# Patient Record
Sex: FEMALE
[De-identification: names, ages, dates, MRNs, and addresses within clinical notes are randomized per-mention and may not be internally consistent; named-entity substitution may affect disease eponyms.]

---

## 2020-11-17 ENCOUNTER — HOSPITAL ENCOUNTER (OUTPATIENT)
Dept: HOSPITAL 5 - SPVWC | Age: 65
Discharge: HOME | End: 2020-11-17
Attending: SURGERY
Payer: MEDICARE

## 2020-11-17 DIAGNOSIS — R92.8: Primary | ICD-10-CM

## 2020-11-17 PROCEDURE — 77066 DX MAMMO INCL CAD BI: CPT

## 2021-05-06 ENCOUNTER — HOSPITAL ENCOUNTER (OUTPATIENT)
Dept: HOSPITAL 5 - SPVWC | Age: 66
Discharge: HOME | End: 2021-05-06
Attending: SURGERY
Payer: MEDICARE

## 2021-05-06 DIAGNOSIS — N60.01: Primary | ICD-10-CM

## 2021-05-06 NOTE — ULTRASOUND REPORT
ULTRASOUND BREAST BILATERAL COMPLETE, 5/6/2021



CLINICAL INFORMATION / INDICATION: Follow-up cystic areas.



TECHNIQUE: Complete sonographic evaluation of all 4 quadrants and retroareolar region was performed. 
 



COMPARISON: Bilateral breast ultrasound 11/17/2020



FINDINGS: On the right multiple small breast cysts are again seen which all appear simple now an lorelei
gn. Largest measures 1 cm in the 11:00 position centrally. 9 appearing ductal ectasia is seen in the 
retroareolar region. No significant axillary abnormalities are seen.



On the left retroareolar area of what appear to be small benign simple cysts are seen with perhaps a 
small amount of benign-appearing ductal ectasia. In the 1:00 position a minimal simple cyst is seen. 
In the 2:00 position, 1 cm from the nipple, an ovoid wider than tall 6 mm comminuted cyst versus lorelei
gn-appearing nodule is seen. This appears likely to be the 6 mm hypoechoic area seen previously and t
nagi this appears reduced in volume though stable in greatest diameter. In the 2:00 position in the r
etroareolar region, a complicated cystic area is seen measuring just under 8 mm which measured just o
zane 8 mm previously. This at least has not increased in size and may have decreased.





IMPRESSION: 

1. On the right only benign simple cysts are seen today.

2. On the left benign-appearing abnormalities are at least stable if not slightly improved.



Follow up recommendation: Follow-up left breast ultrasound in 6 months



BI-RADS Category 3:  Probably Benign. Followup in 6 months.





-------------------------------------------------------------------------------------------

A normal or "negative" report should not preclude biopsy or follow-up of a clinically suspicious find
ing.



Signer Name: James Hernandez MD 

Signed: 5/6/2021 10:43 AM

Workstation Name: [email or web address]

## 2021-06-21 ENCOUNTER — DASHBOARD ENCOUNTERS (OUTPATIENT)
Age: 66
End: 2021-06-21

## 2021-06-21 ENCOUNTER — LAB OUTSIDE AN ENCOUNTER (OUTPATIENT)
Dept: URBAN - METROPOLITAN AREA CLINIC 70 | Facility: CLINIC | Age: 66
End: 2021-06-21

## 2021-06-21 ENCOUNTER — OFFICE VISIT (OUTPATIENT)
Dept: URBAN - METROPOLITAN AREA CLINIC 70 | Facility: CLINIC | Age: 66
End: 2021-06-21
Payer: MEDICARE

## 2021-06-21 ENCOUNTER — WEB ENCOUNTER (OUTPATIENT)
Dept: URBAN - METROPOLITAN AREA CLINIC 70 | Facility: CLINIC | Age: 66
End: 2021-06-21

## 2021-06-21 DIAGNOSIS — R19.8 ALTERNATING CONSTIPATION AND DIARRHEA: ICD-10-CM

## 2021-06-21 DIAGNOSIS — Z86.010 PERSONAL HISTORY OF COLONIC POLYPS: ICD-10-CM

## 2021-06-21 PROBLEM — 428283002 HISTORY OF POLYP OF COLON (SITUATION): Status: ACTIVE | Noted: 2021-06-21

## 2021-06-21 PROCEDURE — 99214 OFFICE O/P EST MOD 30 MIN: CPT | Performed by: INTERNAL MEDICINE

## 2021-06-21 RX ORDER — DICYCLOMINE HYDROCHLORIDE 20 MG/1
TAKE 1 TABLET (20 MG) BY ORAL ROUTE 4 TIMES PER DAY TABLET ORAL
Qty: 0 | Refills: 0 | Status: DISCONTINUED | COMMUNITY
Start: 1900-01-01

## 2021-06-21 RX ORDER — ACETAMINOPHEN AND CODEINE PHOSPHATE 300; 30 MG/1; MG/1
1 TABLET AS NEEDED TABLET ORAL
Status: ACTIVE | COMMUNITY

## 2021-06-21 RX ORDER — IBUPROFEN 600 MG/1
TAKE 1 TABLET (600 MG) BY ORAL ROUTE EVERY 6 HOURS AS NEEDED WITH FOOD TABLET ORAL
Qty: 0 | Refills: 0 | Status: DISCONTINUED | COMMUNITY
Start: 1900-01-01

## 2021-06-21 RX ORDER — ALBUTEROL SULFATE 90 UG/1
1 PUFF AS NEEDED AEROSOL, METERED RESPIRATORY (INHALATION)
Status: ACTIVE | COMMUNITY

## 2021-06-21 RX ORDER — ATORVASTATIN CALCIUM 20 MG/1
1 TABLET TABLET, FILM COATED ORAL ONCE A DAY
Status: ACTIVE | COMMUNITY

## 2021-06-21 RX ORDER — BUPROPION HYDROCHLORIDE 100 MG/1
1 TABLET TABLET, FILM COATED ORAL TWICE A DAY
Status: ACTIVE | COMMUNITY

## 2021-06-21 RX ORDER — IBUPROFEN 800 MG/1
1 TABLET WITH FOOD OR MILK AS NEEDED TABLET ORAL THREE TIMES A DAY
Status: ACTIVE | COMMUNITY

## 2021-06-21 RX ORDER — UMECLIDINIUM BROMIDE AND VILANTEROL TRIFENATATE 62.5; 25 UG/1; UG/1
1 PUFF POWDER RESPIRATORY (INHALATION) ONCE A DAY
Status: ACTIVE | COMMUNITY

## 2021-06-21 RX ORDER — PANTOPRAZOLE SODIUM 40 MG/1
1 TABLET TABLET, DELAYED RELEASE ORAL ONCE A DAY
Status: ACTIVE | COMMUNITY

## 2021-06-21 RX ORDER — LORATADINE 10 MG
1 TABLET TABLET ORAL ONCE A DAY
Status: ACTIVE | COMMUNITY

## 2021-06-21 RX ORDER — LEVOTHYROXINE SODIUM 125 UG/1
TAKE 1 TABLET (125 MCG) BY ORAL ROUTE ONCE DAILY TABLET ORAL 1
Qty: 0 | Refills: 0 | Status: DISCONTINUED | COMMUNITY
Start: 1900-01-01

## 2021-06-21 RX ORDER — HYDROXYZINE HYDROCHLORIDE 50 MG/1
1 TABLET AS NEEDED TABLET, FILM COATED ORAL
Status: ACTIVE | COMMUNITY

## 2021-06-21 RX ORDER — LEVOTHYROXINE SODIUM 150 UG/1
1 TABLET IN THE MORNING ON AN EMPTY STOMACH TABLET ORAL ONCE A DAY
Status: ACTIVE | COMMUNITY

## 2021-06-21 RX ORDER — DIPHENHYDRAMINE HCL 25 MG/1
TAKE 1 TABLET (25 MG) BY ORAL ROUTE EVERY 6 HOURS AS NEEDED TABLET, COATED ORAL
Qty: 0 | Refills: 0 | Status: DISCONTINUED | COMMUNITY
Start: 1900-01-01

## 2021-06-21 NOTE — HPI-TODAY'S VISIT:
History of colon polyps:  Patient presents today to schedule surveillance colonoscopy due to personal history of colon polyps.  Last colonoscopy was September 2018 with 2 diminutive polyps (TA and HP) and a 7 mm SSA polyp in ascending colon were removed.  It was recommended procedure be repeat in 3 years.  Patient currently denies abdominal pain, changes in bowel habits, constipation, or rectal bleeding.  Currently voices complaints of diarrhea/loose stools with defecation occuring up to 5-6x/day.   Voices fecal urgency and occasional fecal incontinence due to this.  AS a result, has started to rely on use of incontinence pads.  Periumbilical abdominal pain occurs prior to defecation. This is described as a pressure sensation that improves slightly with defecation.

## 2021-07-12 ENCOUNTER — OFFICE VISIT (OUTPATIENT)
Dept: URBAN - METROPOLITAN AREA SURGERY CENTER 24 | Facility: SURGERY CENTER | Age: 66
End: 2021-07-12